# Patient Record
Sex: FEMALE | Race: BLACK OR AFRICAN AMERICAN
[De-identification: names, ages, dates, MRNs, and addresses within clinical notes are randomized per-mention and may not be internally consistent; named-entity substitution may affect disease eponyms.]

---

## 2017-06-30 ENCOUNTER — HOSPITAL ENCOUNTER (OUTPATIENT)
Dept: HOSPITAL 35 - RAD | Age: 62
End: 2017-06-30
Attending: INTERNAL MEDICINE
Payer: COMMERCIAL

## 2017-06-30 DIAGNOSIS — Z12.31: Primary | ICD-10-CM

## 2018-07-06 ENCOUNTER — HOSPITAL ENCOUNTER (OUTPATIENT)
Dept: HOSPITAL 35 - RAD | Age: 63
End: 2018-07-06
Attending: INTERNAL MEDICINE
Payer: COMMERCIAL

## 2018-07-06 DIAGNOSIS — Z12.31: Primary | ICD-10-CM

## 2019-09-06 ENCOUNTER — HOSPITAL ENCOUNTER (OUTPATIENT)
Dept: HOSPITAL 35 - ULTRA | Age: 64
End: 2019-09-06
Attending: INTERNAL MEDICINE
Payer: COMMERCIAL

## 2019-09-06 DIAGNOSIS — R09.89: Primary | ICD-10-CM

## 2019-09-09 ENCOUNTER — HOSPITAL ENCOUNTER (OUTPATIENT)
Dept: HOSPITAL 35 - ULTRA | Age: 64
End: 2019-09-09
Attending: ORTHOPAEDIC SURGERY
Payer: COMMERCIAL

## 2019-09-09 DIAGNOSIS — M79.662: ICD-10-CM

## 2019-09-09 DIAGNOSIS — M79.89: Primary | ICD-10-CM

## 2020-07-10 ENCOUNTER — HOSPITAL ENCOUNTER (OUTPATIENT)
Dept: HOSPITAL 35 - RAD | Age: 65
End: 2020-07-10
Attending: INTERNAL MEDICINE
Payer: COMMERCIAL

## 2020-07-10 DIAGNOSIS — Z12.31: Primary | ICD-10-CM

## 2020-08-05 ENCOUNTER — HOSPITAL ENCOUNTER (OUTPATIENT)
Dept: HOSPITAL 35 - PAIN | Age: 65
Discharge: HOME | End: 2020-08-05
Payer: COMMERCIAL

## 2020-08-05 VITALS — SYSTOLIC BLOOD PRESSURE: 178 MMHG | DIASTOLIC BLOOD PRESSURE: 78 MMHG

## 2020-08-05 VITALS — BODY MASS INDEX: 28.35 KG/M2 | HEIGHT: 70 IN | WEIGHT: 198 LBS

## 2020-08-05 DIAGNOSIS — Z98.890: ICD-10-CM

## 2020-08-05 DIAGNOSIS — E07.9: ICD-10-CM

## 2020-08-05 DIAGNOSIS — I10: ICD-10-CM

## 2020-08-05 DIAGNOSIS — Z88.8: ICD-10-CM

## 2020-08-05 DIAGNOSIS — G89.29: ICD-10-CM

## 2020-08-05 DIAGNOSIS — Z79.899: ICD-10-CM

## 2020-08-05 DIAGNOSIS — M19.90: ICD-10-CM

## 2020-08-05 DIAGNOSIS — M54.16: Primary | ICD-10-CM

## 2020-08-05 NOTE — NUR
Pain Clinic Assessment:
 
1. History of Osteoarthritis:
BACK
BOTH LEGS
   History of Rheumatoid Arthritis:
Not Applicable
 
2. Height: 5 ft. 10 in. 177.8 cm.
   Weight: 198.0 lb.  oz. 89.812 kg.
   Patient's BMI: 28.4
 
3. Vital Signs:
   BP: 178/78 Pulse: 76 Resp: 16
   Temp:  02 Sat: 100 ECG Mon:
 
4. Pain Intensity: 8
 
5. Fall Risk:
   Dizziness: N  Needs help standing or walking: Y
   Fallen in the last 3 months: N
   Fall risk comments:
 
 
6. Patient on Blood Thinner: None
 
7. History of Hypertension: Y
 
8. Opioid Therapy greater than 6 weeks: N
   Opiate Contract Signed:
 
9. Risk Assessment Tool Provided: LOW RISK 1/3
 
10. Functional Assessment Tool: 49/70
 
11. Recreational Drug Use: Never Drug Type:
    Tobacco Use: Never Smoker Tobacco Type:
       Amount or Packs/day:  How Many Years:
    Alcohol Use: No  Frequency:  Quant:

## 2020-08-18 NOTE — HPC
[facility]
[address]
[place]   [address]                     PAIN MANAGEMENT CONSULTATION  
_______________________________________________________________________________
 
Name:       GERTRUDE SHOEMAKER                 Room #:                     REG TESFAYE ROSARIODenaGABEDena#:      [id number]                       Account #:      [id number]  
Admission:  08/05/20    Attend Phys:    GUILLE Biswas MD    
Discharge:              Date of Birth:  [birth year]  
                                                          Report #: [id number]
                                                                    [id number]   
_______________________________________________________________________________
THIS REPORT FOR:  
 
cc:  Zev Reilly MD, Kirk D. MD Brown, N. Wayne MD                                            ~
CC: Zev Biswas
 
DATE OF SERVICE:  08/05/2020
 
 
CHIEF COMPLAINT:  Low back and left leg pain.
 
HISTORY:  The patient is a 64-year-old female who has been referred to the Pain
Clinic because of pain and discomfort in the low back area.  She has suffered
from lumbar radicular pain in the past.  She is having pain in the lower portion
of her back that radiates down to the posterior portion of her legs.  She rates
the pain as an 8/10.  Notes that the pain is worse when she is getting out of
bed as well as while walking.  Pain improves somewhat when she is sitting.  She
describes it as continuous, burning, aching and stabbing.  She has been getting
worse over the last year.  She has been suffering from some bilateral knee pain.
 She has torn meniscus.  She has undergone physical therapy.  She has returned
to the Pain Clinic for an evaluation.
 
ALLERGIES:  MORPHINE, NABUMETONE, TRAMADOL, SHRIMP.
 
CURRENT MEDICATIONS:  Cozaar 100 mg, bumetanide 1 mg, clonidine 0.2 mg b.i.d.,
Zetia, ibuprofen 200 mg, Deuxis 800/26.6 t.i.d., meloxicam 15 mg, and
levothyroxine 75 mcg.
 
PAST MEDICAL HISTORY:  Thyroid disease, colon problems, and hypertension.
 
PAST SURGICAL HISTORY:  Hysterectomy, bilateral carpal tunnel surgery
2005, rectocele, cystocele, left knee surgery 2010, 2019, right knee surgery,
12/2019.
 
SOCIAL HISTORY:  She works as an .  She is still working.
 
REVIEW OF SYSTEMS:
GENERAL:  Good health, recent weight changes, decreased appetite, wears glasses,
constipation, varicose veins, thyroid disease.
 
LABORATORY DATA:  No new laboratory values are available, but MRI dated 06/2008
reveals large left lateral herniated nucleus pulposus, HNP, L4-L5.  Small
central HNP, L5-S1.
 
 
 
[address]   [address]                     PAIN MANAGEMENT CONSULTATION  
_______________________________________________________________________________
 
Name:       GERTRUDE SHOEMAKER                 Room #:                     REG CLROSELINE 
TRIPP#:      [id number]                       Account #:      [id number]  
Admission:  08/05/20    Attend Phys:    GUILLE Biswas MD    
Discharge:              Date of Birth:  [birth year]  
                                                          Report #: [id number]
                                                                    [id number]   
_______________________________________________________________________________
 
PAIN CLINIC ASSESSMENT AND PQRS:
1.  History of osteoarthritis in the back and involving both knees.
2.  History of rheumatoid arthritis.  The patient is not being treated for
rheumatoid arthritis.
3.  Height 5 feet 10 inch, weight 198 pounds, BMI is 28.4.
4.  Vital signs:  Blood pressure 178/78, pulse 76, respiratory rate 16, room air
saturations 100%.
5.  Pain intensity, 8/10.
6.  Fall history:  The patient has not fallen in the last 3 months.
7.  Blood thinner.  The patient is not on a blood thinning medication.
8.  Hypertension.  The patient is being treated for hypertension.
9.  Opioids greater than 6 weeks.  The patient receives medication from her
primary physician.
10.  Risk assessment tool, low for opioid use.
11.  Functional assessment tool, 49/70.
12.  Recreational drug use.  The patient denies.
13.  Tobacco:  The patient has never smoked.
14.  Alcohol.  The patient denies frequent use of alcoholic beverages.
 
PHYSICAL EXAMINATION:
GENERAL:  The patient is a well-developed, well-nourished black female, appears
her stated age.  She is alert and oriented x 3.  Her affect is appropriate. 
Speech is fluent.
HEENT:  Normocephalic, atraumatic.  Extraocular eye muscles intact.  Sclerae
nonicteric.  Mucous membranes are moist.
NECK:  Without adenopathy or JVD.
HEART:  Regular rate.
LUNGS:  Clear to auscultation.
ABDOMEN:  Nontender.
MUSCULOSKELETAL:  The patient is without significant scoliosis, kyphosis or
lordosis.  The patient has pain and discomfort in lower portion of her back. 
Has pain that radiates down the lateral portion of her right leg down into the
calf area and notes some pain and discomfort radiating down on the right lateral
portion of her leg and can radiate down into the area of the calf.
 
IMPRESSION:
1.  Lumbar radiculopathy, L4-L5 area.
2.  Thyroid disease.
3.  Colon problems.
4.  Hypertension.
 
RECOMMENDATIONS:  We discussed treatment options with the patient.  Risks and
benefits of an epidural steroid injection were discussed.  The patient elects to
proceed with an epidural steroid injection
 
 
 
 
[address]   [address]                     PAIN MANAGEMENT CONSULTATION  
_______________________________________________________________________________
 
Name:       GERTRUDE SHOEMAKER                 Room #:                     REG CLI 
M.R.#:      [id number]                       Account #:      [id number]  
Admission:  08/05/20    Attend Phys:    GUILLE Biswas MD    
Discharge:              Date of Birth:  [birth year]  
                                                          Report #: [id number]
                                                                    [id number]   
_______________________________________________________________________________
PROCEDURE NOTE:  The patient was taken to the procedure area.  Her back had been
sterilely prepped with a Betadine solution.  We had discussed the risks and
benefits of the procedure.  We had used a model to indicate the area of probable
pathology.  The patient was then assisted in getting on the examination table. 
Her back was sterilely prepped with a Betadine solution.  Fluoroscopy using
anterior, posterior as well as lateral viewing were implemented.  A 17-gauge
Tuohy with loss of resistance technique was advanced into the L4-L5 area, after
this area had been sterilely prepped and infiltrated with 0.25% bupivacaine
using a 25-gauge needle.  After appropriate placement, a total of 80 mg
Depo-Medrol, 40 mg triamcinolone and 2 mL of 0.25% bupivacaine was injected. 
The patient tolerated the procedure well.  She remained in the Pain Clinic for
an appropriate amount of time.  A total of 13 seconds fluoro time was used.  The
patient's pain decreased to 0 at the time of discharge.  She will follow up in
the future as needed.
 
 
 
 
 
 
 
 
 
 
 
 
 
 
 
 
 
 
 
 
 
 
 
 
 
 
 
 
 
 
  <ELECTRONICALLY SIGNED>
   By: GUILLE Biswas MD            
  08/18/20     0823
D: 08/17/20 2118                           _____________________________________
T: 08/18/20 0434                           GUILLE Biswas MD              /nt

## 2020-09-04 ENCOUNTER — HOSPITAL ENCOUNTER (OUTPATIENT)
Dept: HOSPITAL 35 - PAIN | Age: 65
Discharge: HOME | End: 2020-09-04
Payer: COMMERCIAL

## 2020-09-04 VITALS — SYSTOLIC BLOOD PRESSURE: 148 MMHG | DIASTOLIC BLOOD PRESSURE: 84 MMHG

## 2020-09-04 VITALS — HEIGHT: 70 IN | WEIGHT: 192.6 LBS | BODY MASS INDEX: 27.57 KG/M2

## 2020-09-04 DIAGNOSIS — Z88.8: ICD-10-CM

## 2020-09-04 DIAGNOSIS — Z98.890: ICD-10-CM

## 2020-09-04 DIAGNOSIS — G89.29: ICD-10-CM

## 2020-09-04 DIAGNOSIS — M54.16: Primary | ICD-10-CM

## 2020-09-04 DIAGNOSIS — Z79.899: ICD-10-CM

## 2020-09-04 NOTE — NUR
Pain Clinic Assessment:
 
1. History of Osteoarthritis:
BACK
BOTH LEGS
   History of Rheumatoid Arthritis:
Not Applicable
 
2. Height: 5 ft. 10 in. 177.8 cm.
   Weight: 192.6 lb.  oz. 87.363 kg.
   Patient's BMI: 27.6
 
3. Vital Signs:
   BP: 148/84 Pulse: 76 Resp: 16
   Temp:  02 Sat: 100 ECG Mon:
 
4. Pain Intensity: 8
 
5. Fall Risk:
   Dizziness: N  Needs help standing or walking: Y
   Fallen in the last 3 months: N
   Fall risk comments:
 
 
6. Patient on Blood Thinner: None
 
7. History of Hypertension: Y
 
8. Opioid Therapy greater than 6 weeks: N
   Opiate Contract Signed:
 
9. Risk Assessment Tool Provided: LOW RISK 1/3
 
10. Functional Assessment Tool: 49/70
 
11. Recreational Drug Use: Never Drug Type:
    Tobacco Use: Never Smoker Tobacco Type:
       Amount or Packs/day:  How Many Years:
    Alcohol Use: No  Frequency:  Quant:

## 2020-11-25 ENCOUNTER — HOSPITAL ENCOUNTER (OUTPATIENT)
Dept: HOSPITAL 35 - PAIN | Age: 65
Discharge: HOME | End: 2020-11-25
Payer: COMMERCIAL

## 2020-11-25 VITALS — SYSTOLIC BLOOD PRESSURE: 157 MMHG | DIASTOLIC BLOOD PRESSURE: 83 MMHG

## 2020-11-25 VITALS — BODY MASS INDEX: 26.37 KG/M2 | HEIGHT: 70 IN | WEIGHT: 184.2 LBS

## 2020-11-25 DIAGNOSIS — M19.90: ICD-10-CM

## 2020-11-25 DIAGNOSIS — G89.29: ICD-10-CM

## 2020-11-25 DIAGNOSIS — Z98.890: ICD-10-CM

## 2020-11-25 DIAGNOSIS — I10: ICD-10-CM

## 2020-11-25 DIAGNOSIS — Z79.899: ICD-10-CM

## 2020-11-25 DIAGNOSIS — E07.9: ICD-10-CM

## 2020-11-25 DIAGNOSIS — M54.16: Primary | ICD-10-CM

## 2020-11-25 DIAGNOSIS — Z90.710: ICD-10-CM

## 2020-11-25 NOTE — NUR
Pain Clinic Assessment:
 
1. History of Osteoarthritis:
BACK
BOTH LEGS
   History of Rheumatoid Arthritis:
Not Applicable
 
2. Height: 5 ft. 10 in. 177.8 cm.
   Weight: 184.2 lb.  oz. 83.553 kg.
   Patient's BMI: 26.4
 
3. Vital Signs:
   BP: 157/83 Pulse: 85 Resp: 20
   Temp:  02 Sat: 100 ECG Mon:
 
4. Pain Intensity: 8
 
5. Fall Risk:
   Dizziness: N  Needs help standing or walking: Y
   Fallen in the last 3 months: Y
   Fall risk comments:
 
 
6. Patient on Blood Thinner: None
 
7. History of Hypertension: Y
 
8. Opioid Therapy greater than 6 weeks: N
   Opiate Contract Signed:
 
9. Risk Assessment Tool Provided: LOW RISK 1/3
 
10. Functional Assessment Tool: 49/70
 
11. Recreational Drug Use: Never Drug Type:
    Tobacco Use: Never Smoker Tobacco Type:
       Amount or Packs/day:  How Many Years:
    Alcohol Use: No  Frequency:  Quant:

## 2020-12-26 ENCOUNTER — HOSPITAL ENCOUNTER (EMERGENCY)
Dept: HOSPITAL 35 - ER | Age: 65
Discharge: HOME | End: 2020-12-26
Payer: COMMERCIAL

## 2020-12-26 VITALS — DIASTOLIC BLOOD PRESSURE: 69 MMHG | SYSTOLIC BLOOD PRESSURE: 185 MMHG

## 2020-12-26 VITALS — WEIGHT: 180.01 LBS | BODY MASS INDEX: 25.77 KG/M2 | HEIGHT: 70 IN

## 2020-12-26 DIAGNOSIS — Z79.899: ICD-10-CM

## 2020-12-26 DIAGNOSIS — Z88.8: ICD-10-CM

## 2020-12-26 DIAGNOSIS — Z91.013: ICD-10-CM

## 2020-12-26 DIAGNOSIS — M54.32: Primary | ICD-10-CM

## 2020-12-26 DIAGNOSIS — Z88.5: ICD-10-CM

## 2020-12-26 DIAGNOSIS — Z98.890: ICD-10-CM

## 2020-12-26 DIAGNOSIS — Z90.711: ICD-10-CM

## 2021-01-09 ENCOUNTER — HOSPITAL ENCOUNTER (EMERGENCY)
Dept: HOSPITAL 35 - ER | Age: 66
Discharge: HOME | End: 2021-01-09
Payer: COMMERCIAL

## 2021-01-09 VITALS — HEIGHT: 69 IN | WEIGHT: 180.01 LBS | BODY MASS INDEX: 26.66 KG/M2

## 2021-01-09 VITALS — SYSTOLIC BLOOD PRESSURE: 198 MMHG | DIASTOLIC BLOOD PRESSURE: 106 MMHG

## 2021-01-09 DIAGNOSIS — I10: ICD-10-CM

## 2021-01-09 DIAGNOSIS — Z91.013: ICD-10-CM

## 2021-01-09 DIAGNOSIS — Z79.899: ICD-10-CM

## 2021-01-09 DIAGNOSIS — Z90.710: ICD-10-CM

## 2021-01-09 DIAGNOSIS — M54.32: Primary | ICD-10-CM

## 2021-01-09 DIAGNOSIS — Z79.1: ICD-10-CM

## 2021-01-09 DIAGNOSIS — M53.86: ICD-10-CM

## 2021-01-09 DIAGNOSIS — Z88.8: ICD-10-CM

## 2021-01-09 DIAGNOSIS — Z88.5: ICD-10-CM

## 2021-01-13 ENCOUNTER — HOSPITAL ENCOUNTER (OUTPATIENT)
Dept: HOSPITAL 35 - PAIN | Age: 66
Discharge: HOME | End: 2021-01-13
Payer: COMMERCIAL

## 2021-01-13 VITALS — BODY MASS INDEX: 26.28 KG/M2 | WEIGHT: 183.6 LBS | HEIGHT: 70 IN

## 2021-01-13 VITALS — DIASTOLIC BLOOD PRESSURE: 80 MMHG | SYSTOLIC BLOOD PRESSURE: 174 MMHG

## 2021-01-13 DIAGNOSIS — Z79.899: ICD-10-CM

## 2021-01-13 DIAGNOSIS — G89.29: ICD-10-CM

## 2021-01-13 DIAGNOSIS — Z88.8: ICD-10-CM

## 2021-01-13 DIAGNOSIS — I10: ICD-10-CM

## 2021-01-13 DIAGNOSIS — Z98.890: ICD-10-CM

## 2021-01-13 DIAGNOSIS — M54.16: Primary | ICD-10-CM

## 2021-01-13 DIAGNOSIS — Z90.710: ICD-10-CM

## 2021-01-13 DIAGNOSIS — M19.90: ICD-10-CM

## 2021-01-13 DIAGNOSIS — E07.9: ICD-10-CM

## 2021-01-13 NOTE — NUR
Pain Clinic Assessment:
 
1. History of Osteoarthritis:
BACK
BOTH LEGS
   History of Rheumatoid Arthritis:
Not Applicable
 
2. Height: 5 ft. 10 in. 177.8 cm.
   Weight: 183.6 lb.  oz. 83.280 kg.
   Patient's BMI: 26.3
 
3. Vital Signs:
   BP: 174/80 Pulse: 87 Resp: 20
   Temp:  02 Sat: 100 ECG Mon:
 
4. Pain Intensity: 7
 
5. Fall Risk:
   Dizziness: N  Needs help standing or walking: Y
   Fallen in the last 3 months: N
   Fall risk comments:
 
 
6. Patient on Blood Thinner: None
 
7. History of Hypertension: Y
 
8. Opioid Therapy greater than 6 weeks: N
   Opiate Contract Signed:
 
9. Risk Assessment Tool Provided: LOW RISK 1/3
 
10. Functional Assessment Tool: 49/70
 
11. Recreational Drug Use: Never Drug Type:
    Tobacco Use: Former Smoker Tobacco Type:
       Amount or Packs/day:  How Many Years:
    Alcohol Use: No  Frequency:  Quant:

## 2021-05-05 ENCOUNTER — HOSPITAL ENCOUNTER (OUTPATIENT)
Dept: HOSPITAL 35 - PAIN | Age: 66
Discharge: HOME | End: 2021-05-05
Payer: COMMERCIAL

## 2021-05-05 VITALS — WEIGHT: 186.4 LBS | BODY MASS INDEX: 26.69 KG/M2 | HEIGHT: 70 IN

## 2021-05-05 VITALS — SYSTOLIC BLOOD PRESSURE: 164 MMHG | DIASTOLIC BLOOD PRESSURE: 80 MMHG

## 2021-05-05 DIAGNOSIS — Z98.890: ICD-10-CM

## 2021-05-05 DIAGNOSIS — G89.29: ICD-10-CM

## 2021-05-05 DIAGNOSIS — Z79.899: ICD-10-CM

## 2021-05-05 DIAGNOSIS — Z87.891: ICD-10-CM

## 2021-05-05 DIAGNOSIS — Z88.8: ICD-10-CM

## 2021-05-05 DIAGNOSIS — E07.9: ICD-10-CM

## 2021-05-05 DIAGNOSIS — M54.16: Primary | ICD-10-CM

## 2021-05-05 DIAGNOSIS — Z90.710: ICD-10-CM

## 2021-05-05 DIAGNOSIS — M19.90: ICD-10-CM

## 2021-05-05 DIAGNOSIS — I10: ICD-10-CM

## 2021-05-05 NOTE — NUR
Pain Clinic Assessment:
 
1. History of Osteoarthritis:
BACK
BOTH LEGS
   History of Rheumatoid Arthritis:
Not Applicable
 
2. Height: 5 ft. 10 in. 177.8 cm.
   Weight: 186.4 lb.  oz. 84.551 kg.
   Patient's BMI: 26.7
 
3. Vital Signs:
   BP: 164/80 Pulse: 68 Resp: 16
   Temp:  02 Sat: 100 ECG Mon:
 
4. Pain Intensity: 8
 
5. Fall Risk:
   Dizziness: N  Needs help standing or walking: Y
   Fallen in the last 3 months: N
   Fall risk comments:
 
 
6. Patient on Blood Thinner: None
 
7. History of Hypertension: Y
 
8. Opioid Therapy greater than 6 weeks: N
   Opiate Contract Signed:
 
9. Risk Assessment Tool Provided: LOW RISK 1/3
 
10. Functional Assessment Tool: 49/70
 
11. Recreational Drug Use: Never Drug Type:
    Tobacco Use: Former Smoker Tobacco Type:
       Amount or Packs/day:  How Many Years:
    Alcohol Use: No  Frequency:  Quant:

## 2021-06-11 ENCOUNTER — HOSPITAL ENCOUNTER (OUTPATIENT)
Dept: HOSPITAL 35 - PAIN | Age: 66
Discharge: HOME | End: 2021-06-11
Payer: COMMERCIAL

## 2021-06-11 VITALS — SYSTOLIC BLOOD PRESSURE: 164 MMHG | DIASTOLIC BLOOD PRESSURE: 80 MMHG

## 2021-06-11 VITALS — BODY MASS INDEX: 26.77 KG/M2 | HEIGHT: 70 IN | WEIGHT: 187 LBS

## 2021-06-11 DIAGNOSIS — E07.9: ICD-10-CM

## 2021-06-11 DIAGNOSIS — Z88.8: ICD-10-CM

## 2021-06-11 DIAGNOSIS — M19.90: ICD-10-CM

## 2021-06-11 DIAGNOSIS — Z90.710: ICD-10-CM

## 2021-06-11 DIAGNOSIS — M54.16: Primary | ICD-10-CM

## 2021-06-11 DIAGNOSIS — Z79.899: ICD-10-CM

## 2021-06-11 DIAGNOSIS — G89.29: ICD-10-CM

## 2021-06-11 DIAGNOSIS — Z98.890: ICD-10-CM

## 2021-06-11 DIAGNOSIS — Z87.891: ICD-10-CM

## 2021-06-11 DIAGNOSIS — I10: ICD-10-CM

## 2021-06-11 NOTE — NUR
Pain Clinic Assessment:
 
1. History of Osteoarthritis:
BACK
BOTH LEGS
   History of Rheumatoid Arthritis:
Not Applicable
 
2. Height: 5 ft. 10 in. 177.8 cm.
   Weight: 187.0 lb.  oz. 84.823 kg.
   Patient's BMI: 26.8
 
3. Vital Signs:
   BP: 164/80 Pulse: 65 Resp: 18
   Temp:  02 Sat: 100 ECG Mon:
 
4. Pain Intensity: 4
 
5. Fall Risk:
   Dizziness: Y  Needs help standing or walking: N
   Fallen in the last 3 months: N
   Fall risk comments:
 
 
6. Patient on Blood Thinner: None
 
7. History of Hypertension: Y
 
8. Opioid Therapy greater than 6 weeks: N
   Opiate Contract Signed:
 
9. Risk Assessment Tool Provided: LOW RISK 1/3
 
10. Functional Assessment Tool: 49/70
 
11. Recreational Drug Use: Never Drug Type:
    Tobacco Use: Former Smoker Tobacco Type:
       Amount or Packs/day:  How Many Years:
    Alcohol Use: No  Frequency:  Quant:

## 2021-08-10 ENCOUNTER — HOSPITAL ENCOUNTER (INPATIENT)
Dept: HOSPITAL 35 - ER | Age: 66
LOS: 2 days | Discharge: HOME | DRG: 155 | End: 2021-08-12
Attending: INTERNAL MEDICINE | Admitting: INTERNAL MEDICINE
Payer: COMMERCIAL

## 2021-08-10 VITALS — WEIGHT: 145 LBS | HEIGHT: 70 IN | BODY MASS INDEX: 20.76 KG/M2

## 2021-08-10 VITALS — SYSTOLIC BLOOD PRESSURE: 269 MMHG | DIASTOLIC BLOOD PRESSURE: 127 MMHG

## 2021-08-10 DIAGNOSIS — I10: ICD-10-CM
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
## 2021-08-10 DIAGNOSIS — K11.20: Primary | ICD-10-CM

## 2021-08-10 DIAGNOSIS — Z91.013: ICD-10-CM

## 2021-08-10 DIAGNOSIS — I16.0: ICD-10-CM

## 2021-08-10 DIAGNOSIS — Z88.8: ICD-10-CM

## 2021-08-10 DIAGNOSIS — Z90.710: ICD-10-CM

## 2021-08-10 DIAGNOSIS — Z88.5: ICD-10-CM

## 2021-08-10 DIAGNOSIS — E78.5: ICD-10-CM

## 2021-08-10 DIAGNOSIS — N17.9: ICD-10-CM

## 2021-08-10 DIAGNOSIS — Z20.822: ICD-10-CM

## 2021-08-10 DIAGNOSIS — Z79.899: ICD-10-CM

## 2021-08-10 DIAGNOSIS — E78.00: ICD-10-CM

## 2021-08-10 DIAGNOSIS — E03.9: ICD-10-CM

## 2021-08-10 DIAGNOSIS — T78.1XXA: ICD-10-CM

## 2021-08-10 DIAGNOSIS — E11.9: ICD-10-CM

## 2021-08-10 LAB
ALBUMIN SERPL-MCNC: 3.6 G/DL (ref 3.4–5)
ALT SERPL-CCNC: 35 U/L (ref 30–65)
ANION GAP SERPL CALC-SCNC: 10 MMOL/L (ref 7–16)
AST SERPL-CCNC: 17 U/L (ref 15–37)
BASOPHILS NFR BLD AUTO: 0.4 % (ref 0–2)
BILIRUB SERPL-MCNC: 0.4 MG/DL (ref 0.2–1)
BUN SERPL-MCNC: 15 MG/DL (ref 7–18)
CALCIUM SERPL-MCNC: 9.2 MG/DL (ref 8.5–10.1)
CHLORIDE SERPL-SCNC: 106 MMOL/L (ref 98–107)
CO2 SERPL-SCNC: 29 MMOL/L (ref 21–32)
CREAT SERPL-MCNC: 1.1 MG/DL (ref 0.6–1)
EOSINOPHIL NFR BLD: 3.5 % (ref 0–3)
ERYTHROCYTE [DISTWIDTH] IN BLOOD BY AUTOMATED COUNT: 13.9 % (ref 10.5–14.5)
GLUCOSE SERPL-MCNC: 197 MG/DL (ref 74–106)
GRANULOCYTES NFR BLD MANUAL: 52.7 % (ref 36–66)
HCT VFR BLD CALC: 37.6 % (ref 37–47)
HGB BLD-MCNC: 12.4 GM/DL (ref 12–15)
LYMPHOCYTES NFR BLD AUTO: 36.9 % (ref 24–44)
MCH RBC QN AUTO: 30.6 PG (ref 26–34)
MCHC RBC AUTO-ENTMCNC: 33 G/DL (ref 28–37)
MCV RBC: 92.5 FL (ref 80–100)
MONOCYTES NFR BLD: 6.5 % (ref 1–8)
NEUTROPHILS # BLD: 3.3 THOU/UL (ref 1.4–8.2)
PLATELET # BLD: 238 THOU/UL (ref 150–400)
POTASSIUM SERPL-SCNC: 3.5 MMOL/L (ref 3.5–5.1)
PROT SERPL-MCNC: 7.1 G/DL (ref 6.4–8.2)
RBC # BLD AUTO: 4.07 MIL/UL (ref 4.2–5)
SODIUM SERPL-SCNC: 145 MMOL/L (ref 136–145)
WBC # BLD AUTO: 6.2 THOU/UL (ref 4–11)

## 2021-08-10 PROCEDURE — 10081 I&D PILONIDAL CYST COMP: CPT

## 2021-08-11 VITALS — SYSTOLIC BLOOD PRESSURE: 128 MMHG | DIASTOLIC BLOOD PRESSURE: 56 MMHG

## 2021-08-11 VITALS — DIASTOLIC BLOOD PRESSURE: 80 MMHG | SYSTOLIC BLOOD PRESSURE: 155 MMHG

## 2021-08-11 VITALS — SYSTOLIC BLOOD PRESSURE: 175 MMHG | DIASTOLIC BLOOD PRESSURE: 85 MMHG

## 2021-08-11 VITALS — DIASTOLIC BLOOD PRESSURE: 66 MMHG | SYSTOLIC BLOOD PRESSURE: 149 MMHG

## 2021-08-11 VITALS — DIASTOLIC BLOOD PRESSURE: 70 MMHG | SYSTOLIC BLOOD PRESSURE: 164 MMHG

## 2021-08-11 VITALS — SYSTOLIC BLOOD PRESSURE: 145 MMHG | DIASTOLIC BLOOD PRESSURE: 100 MMHG

## 2021-08-11 VITALS — SYSTOLIC BLOOD PRESSURE: 164 MMHG | DIASTOLIC BLOOD PRESSURE: 74 MMHG

## 2021-08-11 VITALS — SYSTOLIC BLOOD PRESSURE: 166 MMHG | DIASTOLIC BLOOD PRESSURE: 88 MMHG

## 2021-08-11 VITALS — SYSTOLIC BLOOD PRESSURE: 219 MMHG | DIASTOLIC BLOOD PRESSURE: 127 MMHG

## 2021-08-11 LAB
ANION GAP SERPL CALC-SCNC: 12 MMOL/L (ref 7–16)
BUN SERPL-MCNC: 8 MG/DL (ref 7–18)
CALCIUM SERPL-MCNC: 9.2 MG/DL (ref 8.5–10.1)
CHLORIDE SERPL-SCNC: 102 MMOL/L (ref 98–107)
CO2 SERPL-SCNC: 26 MMOL/L (ref 21–32)
CREAT SERPL-MCNC: 0.9 MG/DL (ref 0.6–1)
GLUCOSE SERPL-MCNC: 89 MG/DL (ref 74–106)
POTASSIUM SERPL-SCNC: 3.7 MMOL/L (ref 3.5–5.1)
SODIUM SERPL-SCNC: 140 MMOL/L (ref 136–145)

## 2021-08-11 NOTE — EKG
[place] [address] [facility]
[place]  [address]
Phone:  [phone]                    ELECTROCARDIOGRAM REPORT      
_______________________________________________________________________________
 
Name:       GERTRUDE SHOEMAKER                 Room #:         200-I       ADM IN  
..#:      [id number]     Account #:      [id number]  
Admission:  08/10/21    Attend Phys:    Wade Foster
Discharge:              Date of Birth:  [birth year]  
                                                          Report #: [id number]
   [id number]
_______________________________________________________________________________
                         [facility] ED
                                       
Test Date:    2021-08-10               Test Time:    22:04:00
Pat Name:     GERTRUDE SHOEMAKER              Department:   
Patient ID:   [id number]            Room:         [facility]
Gender:       F                        Technician:   
:          [id number]               Requested By: Easton Pinedo
Order Number: [id number] MD:   Kahlil Godinez
                                 Measurements
Intervals                              Axis          
Rate:         66                       P:            50
RI:           191                      QRS:          25
QRSD:         94                       T:            46
QT:           410                                    
QTc:          430                                    
                           Interpretive Statements
Sinus rhythm
Atrial premature complex
Consider left ventricular hypertrophy
No previous ECG available for comparison
Electronically Signed On 2021 7:11:27 CDT by Kahlil Godinez
[email or web address]
 
 
 
 
 
 
 
 
 
 
 
 
 
 
 
 
 
 
 
 
 
  <ELECTRONICALLY SIGNED>
   By: Kahlil Godinez MD, [facility]    
  21     0711
D: 08/10/21 2204                           _____________________________________
T: 08/10/21 2204                           Kahlil Godinez MD, FACC      /EPI

## 2021-08-11 NOTE — NUR
PT IS AXOX4, PLEASANT; C/O SOME PAIN HEADACHE AND FACE/NECK SWELLING; VSS, HX
OF ELEVATED BP, AFEBRILE, SR ON THE MONITOR. DR PERSAUD CONSULTED; PT ON
ABX THERAPY, STEROIDS. POC IS TO CONTINUE ON ABX THERAPY; ASSESS FACE FOR
CONTINUED OR INCREASED SWELLING. POSSIBILITY OF D/C 8/12/21. CONTINUE TO
ASSESS TOLERANCE OF DIET. LOW FALL PRECAUTIONS IN PLACE. NO CONCERNS AT THIS
TIME.

## 2021-08-11 NOTE — NUR
PT ADMITTED TO ROOM 200 FROM ER, IV FLUIDS STARTED IN R AC, VSS, NO C/O PAIN,
ASSESSMENT AS CHARTED, WILL CON'T TO MONITOR PER PPOC.

## 2021-08-12 VITALS — SYSTOLIC BLOOD PRESSURE: 140 MMHG | DIASTOLIC BLOOD PRESSURE: 71 MMHG

## 2021-08-12 VITALS — DIASTOLIC BLOOD PRESSURE: 60 MMHG | SYSTOLIC BLOOD PRESSURE: 138 MMHG

## 2021-08-12 VITALS — SYSTOLIC BLOOD PRESSURE: 138 MMHG | DIASTOLIC BLOOD PRESSURE: 60 MMHG

## 2021-08-12 NOTE — NUR
PT IS AXOX4, PLEASANT; DENIES PAIN; VSS, AFEBRILE, SR ON MONITOR; MED SURG
TELE. PT TO DISCHARGE THIS AM. DISCHARGE EDUCATION COMPLETED. PT COMMUNICATED
UNDERSTANDING. PT TO D/C TO HOME WITH SPOUSE VIA PERSONAL VEHICLE. NO CONCERNS
AT THIS TIME.

## 2021-08-12 NOTE — NUR
SLEPT MOST OF SHIFT. WORKING ON GOALS AND PLAN OF CARE FOR NOC. UP AD YU IN
ROOM WITH STEADY GAIT. SWELLING DECREASING TO LEFT NECK AND FACE. DENIES
COMPLAINTS OF SHORTNESS OF AIR. CONTINUE TO ASSES CLOSELY.

## 2021-08-13 ENCOUNTER — HOSPITAL ENCOUNTER (OUTPATIENT)
Dept: HOSPITAL 35 - BC | Age: 66
End: 2021-08-13
Attending: FAMILY MEDICINE
Payer: COMMERCIAL

## 2021-08-13 DIAGNOSIS — Z12.31: Primary | ICD-10-CM
